# Patient Record
Sex: MALE | Race: WHITE | NOT HISPANIC OR LATINO | Employment: FULL TIME | URBAN - METROPOLITAN AREA
[De-identification: names, ages, dates, MRNs, and addresses within clinical notes are randomized per-mention and may not be internally consistent; named-entity substitution may affect disease eponyms.]

---

## 2017-05-02 PROCEDURE — 33534 CABG ARTERIAL TWO: CPT | Mod: ,,, | Performed by: THORACIC SURGERY (CARDIOTHORACIC VASCULAR SURGERY)

## 2017-05-02 PROCEDURE — 35600 OPEN HRV UXTR ART 1 SGM CAB: CPT | Mod: ,,, | Performed by: THORACIC SURGERY (CARDIOTHORACIC VASCULAR SURGERY)

## 2017-05-02 PROCEDURE — 33508 ENDOSCOPIC VEIN HARVEST: CPT | Mod: ,,, | Performed by: THORACIC SURGERY (CARDIOTHORACIC VASCULAR SURGERY)

## 2017-05-02 PROCEDURE — 33517 CABG ARTERY-VEIN SINGLE: CPT | Mod: ,,, | Performed by: THORACIC SURGERY (CARDIOTHORACIC VASCULAR SURGERY)

## 2017-05-03 ENCOUNTER — OUTSIDE PLACE OF SERVICE (OUTPATIENT)
Dept: ADMINISTRATIVE | Facility: OTHER | Age: 37
End: 2017-05-03
Payer: COMMERCIAL

## 2017-05-08 ENCOUNTER — OUTSIDE PLACE OF SERVICE (OUTPATIENT)
Dept: ADMINISTRATIVE | Facility: OTHER | Age: 37
End: 2017-05-08
Payer: COMMERCIAL

## 2017-05-08 ENCOUNTER — TELEPHONE (OUTPATIENT)
Dept: VASCULAR SURGERY | Facility: CLINIC | Age: 37
End: 2017-05-08

## 2017-05-08 PROCEDURE — 33517 CABG ARTERY-VEIN SINGLE: CPT | Mod: 78,,, | Performed by: THORACIC SURGERY (CARDIOTHORACIC VASCULAR SURGERY)

## 2017-05-08 PROCEDURE — 35600 OPEN HRV UXTR ART 1 SGM CAB: CPT | Mod: 78,,, | Performed by: THORACIC SURGERY (CARDIOTHORACIC VASCULAR SURGERY)

## 2017-05-08 PROCEDURE — 99232 SBSQ HOSP IP/OBS MODERATE 35: CPT | Mod: 57,24,, | Performed by: THORACIC SURGERY (CARDIOTHORACIC VASCULAR SURGERY)

## 2017-05-08 PROCEDURE — 33534 CABG ARTERIAL TWO: CPT | Mod: 78,,, | Performed by: THORACIC SURGERY (CARDIOTHORACIC VASCULAR SURGERY)

## 2017-05-08 PROCEDURE — 33508 ENDOSCOPIC VEIN HARVEST: CPT | Mod: 78,,, | Performed by: THORACIC SURGERY (CARDIOTHORACIC VASCULAR SURGERY)

## 2017-05-08 NOTE — TELEPHONE ENCOUNTER
----- Message from Mariah Fuentes sent at 5/8/2017 11:04 AM CDT -----  Contact: Katherine Sheffield (Spouse)  Katherine Sheffield (Spouse) calling in regards to scheduling a post op in week for a triple bypass. He has never seen the Doctor in the office. Please advise. Call to pod. Call connected to pod. Spoke with Nurse Clarissa and she said to schedule it as a post op. I tried several times but I kept getting a box saying past the template. Please advise.  Call back .  Thanks!

## 2017-05-11 ENCOUNTER — TELEPHONE (OUTPATIENT)
Dept: VASCULAR SURGERY | Facility: CLINIC | Age: 37
End: 2017-05-11

## 2017-05-11 NOTE — TELEPHONE ENCOUNTER
Pt calling requesting refill on [facility]. Pt is s/p CABG on 5/2/17 with po appt on 5/17 in [place]. [place] [phone]/325 #[phone] written for  at  in [place].

## 2017-05-12 ENCOUNTER — TELEPHONE (OUTPATIENT)
Dept: VASCULAR SURGERY | Facility: CLINIC | Age: 37
End: 2017-05-12

## 2017-05-12 NOTE — TELEPHONE ENCOUNTER
----- Message from Perla Garcia sent at 5/12/2017  3:02 PM CDT -----  Contact: wife  marina    Running fever, 101.3 today   Right lower back pain   Hx of kidney stones   Call back     Advised to go to ER with sx of right lower back back and fever of 101.3 and hx of kidney stones and it is Friday afternoon. States understanding.

## 2017-05-17 ENCOUNTER — OFFICE VISIT (OUTPATIENT)
Dept: VASCULAR SURGERY | Facility: CLINIC | Age: 37
End: 2017-05-17
Payer: COMMERCIAL

## 2017-05-17 VITALS
HEART RATE: 85 BPM | WEIGHT: 237.44 LBS | DIASTOLIC BLOOD PRESSURE: 83 MMHG | SYSTOLIC BLOOD PRESSURE: 125 MMHG | HEIGHT: 68 IN | BODY MASS INDEX: 35.99 KG/M2

## 2017-05-17 DIAGNOSIS — Z95.1 S/P CABG (CORONARY ARTERY BYPASS GRAFT): ICD-10-CM

## 2017-05-17 DIAGNOSIS — I25.10 CORONARY ARTERY DISEASE INVOLVING NATIVE CORONARY ARTERY OF NATIVE HEART WITHOUT ANGINA PECTORIS: ICD-10-CM

## 2017-05-17 PROCEDURE — 99024 POSTOP FOLLOW-UP VISIT: CPT | Mod: S$GLB,,, | Performed by: THORACIC SURGERY (CARDIOTHORACIC VASCULAR SURGERY)

## 2017-05-17 RX ORDER — ATORVASTATIN CALCIUM 10 MG/1
TABLET, FILM COATED ORAL
Refills: 0 | COMMUNITY
Start: 2017-05-06 | End: 2021-04-06

## 2017-05-17 RX ORDER — HYDROCODONE BITARTRATE AND ACETAMINOPHEN 5; 325 MG/1; MG/1
TABLET ORAL
Refills: 0 | COMMUNITY
Start: 2017-05-12 | End: 2021-04-06

## 2017-05-17 RX ORDER — COLCHICINE 0.6 MG/1
TABLET ORAL
Refills: 0 | COMMUNITY
Start: 2017-05-07 | End: 2021-04-06

## 2017-05-17 RX ORDER — METOPROLOL TARTRATE 50 MG/1
TABLET ORAL
Refills: 4 | COMMUNITY
Start: 2017-05-06 | End: 2020-12-17

## 2017-05-17 NOTE — PROGRESS NOTES
OFFICE NOTE    This is a 37-year-old gentleman who had a recent coronary artery bypass   grafting.  This was done on 05/02/2017.  He returns to the office today in   followup.  He has done well.  He has no major complaints.  He is ambulatory and   he is getting back to his regular routine.  He has had some trouble sleeping,   but seems to believe that this is secondary to Lipitor and muscle cramps.  This   since has been changed.    On exam today, vital signs stable.  The surgical wounds are clean and dry.    There is no evidence of infection.  His clips and sutures were removed.    At this point, he is doing well.  He is starting cardiac rehabilitation.  I will   defer his medical management to Cardiology Service.  He can see me on an   as-needed basis, but at this point, I think he has a good long-term prognosis.      DIONE  dd: 05/17/2017 14:10:13 (CDT)  td: 05/18/2017 11:16:31 (CDT)  Doc ID   #[id number]  Job ID #[id number]    CC:

## 2020-08-01 ENCOUNTER — OFFICE VISIT (OUTPATIENT)
Dept: PRIMARY CARE CLINIC | Facility: CLINIC | Age: 40
End: 2020-08-01
Payer: COMMERCIAL

## 2020-08-01 VITALS
HEART RATE: 88 BPM | RESPIRATION RATE: 16 BRPM | OXYGEN SATURATION: 94 % | TEMPERATURE: 97 F | DIASTOLIC BLOOD PRESSURE: 82 MMHG | SYSTOLIC BLOOD PRESSURE: 138 MMHG

## 2020-08-01 DIAGNOSIS — Z20.822 SUSPECTED COVID-19 VIRUS INFECTION: Primary | ICD-10-CM

## 2020-08-01 DIAGNOSIS — Z03.818 ENCOUNTER FOR OBSERVATION FOR SUSPECTED EXPOSURE TO OTHER BIOLOGICAL AGENTS RULED OUT: ICD-10-CM

## 2020-08-01 PROCEDURE — 99203 OFFICE O/P NEW LOW 30 MIN: CPT | Mod: S$GLB,CS,, | Performed by: NURSE PRACTITIONER

## 2020-08-01 PROCEDURE — 99203 PR OFFICE/OUTPT VISIT, NEW, LEVL III, 30-44 MIN: ICD-10-PCS | Mod: S$GLB,CS,, | Performed by: NURSE PRACTITIONER

## 2020-08-01 PROCEDURE — U0003 INFECTIOUS AGENT DETECTION BY NUCLEIC ACID (DNA OR RNA); SEVERE ACUTE RESPIRATORY SYNDROME CORONAVIRUS 2 (SARS-COV-2) (CORONAVIRUS DISEASE [COVID-19]), AMPLIFIED PROBE TECHNIQUE, MAKING USE OF HIGH THROUGHPUT TECHNOLOGIES AS DESCRIBED BY CMS-2020-01-R: HCPCS

## 2020-08-01 NOTE — PROGRESS NOTES
Subjective:        Time seen by provider: 1:44 PM on 08/01/2020    Brenton Sheffield Jr is a 40 y.o. male who presents for an evaluation of possible COVID-19. The patient c/o N/V/D x 4 days. He was experiencing 3 episodes of N/V daily, though he denies presence of symptoms today. He denies fever, blood in stool, hematemesis, abdominal pain, or any other symptoms at this time. Patient is tolerating fluids. He denies known exposure to positive COVID-19 patients but expresses concern for possible community-related exposure. His son has also been experiencing similar symptoms. No pertinent PMHx or PSHx.     Review of Systems   Constitutional: Negative for activity change, appetite change, fatigue and fever.   HENT: Negative for congestion, rhinorrhea and sore throat.    Respiratory: Negative for cough, chest tightness, shortness of breath and wheezing.    Cardiovascular: Negative for chest pain and palpitations.   Gastrointestinal: Positive for diarrhea, nausea and vomiting. Negative for abdominal pain and blood in stool.   Musculoskeletal: Negative for arthralgias and myalgias.   Skin: Negative for rash.   Neurological: Negative for weakness, light-headedness and headaches.       Objective:      Physical Exam  Vitals signs and nursing note reviewed.   Constitutional:       General: He is not in acute distress.     Appearance: He is well-developed. He is not diaphoretic.   HENT:      Head: Normocephalic and atraumatic.      Nose: Nose normal.   Eyes:      Conjunctiva/sclera: Conjunctivae normal.   Neck:      Musculoskeletal: Normal range of motion.   Cardiovascular:      Rate and Rhythm: Normal rate and regular rhythm.      Heart sounds: Normal heart sounds. No murmur.   Pulmonary:      Effort: Pulmonary effort is normal. No respiratory distress.      Breath sounds: Normal breath sounds. No wheezing.   Musculoskeletal: Normal range of motion.   Skin:     General: Skin is warm and dry.   Neurological:      Mental Status: He  is alert and oriented to person, place, and time.         Assessment:       1. Suspected Covid-19 Virus Infection    2. Encounter for observation for suspected exposure to other biological agents ruled out        Plan:       1. Suspected Covid-19 Virus Infection    2. Encounter for observation for suspected exposure to other biological agents ruled out    - COVID-19 Routine Screening    2. Discharge home and await results.   3. Return to clinic or ED for new or worsening symptoms.   4. Follow-up with PCP as needed.     Scribe Attestation:   I, Yasmeen Valero, am scribing for, and in the presence of, SHAILESH Fortune. I performed the above scribed service and the documentation accurately describes the services I performed. I attest to the accuracy of the note.    I, SHAILESH Hollis, personally performed the services described in this documentation. All medical record entries made by the scribe were at my direction and in my presence.  I have reviewed the chart and agree that the record reflects my personal performance and is accurate and complete. SHAILESH Hollis.  1:56 PM 08/01/2020

## 2020-08-01 NOTE — PATIENT INSTRUCTIONS
Instructions for Patients with Confirmed or Suspected COVID-19    If you are awaiting your test result, you will either be called or it will be released to the patient portal.  If you have any questions about your test, please visit [email or web address] or call our COVID-19 information line at [phone].      Instructions for non-hospitalized or discharged patients with confirmed or suspected COVID-19:       Stay home except to get medical care.    Separate yourself from other people and animals in your home.    Call ahead before visiting your doctor.    Wear a face mask.    Cover your coughs and sneezes.    Clean your hands often.    Avoid sharing personal household items.    Clean all high-touch surfaces every day.    Monitor your symptoms. Seek prompt medical attention if your illness is worsening (e.g., difficulty breathing). Before seeking care, call your healthcare provider.    If you have a medical emergency and must call 911, notify the dispatcher that you have or are being evaluated for COVID-19. If possible, put on a face mask before emergency medical services arrive.    Use the following symptom-based strategy to return to normal activity following a suspected or confirmed case of COVID-19. Continue isolation until:   o At least 3 days (72 hours) have passed since recovery defined as resolution of fever without the use of fever-reducing medications and improvement in respiratory symptoms (e.g. cough, shortness of breath), and   o At least 10 days have passed since the first positive test.       As one of the next steps, you will receive a call or text from the [place] Department of Health ([facility]) COVID-19 Tracing Team. See the contact information below so you know not to ignore the health departments call. It is important that you contact them back immediately so they can help.     Contact Tracer Number:  [phone]  Caller ID for most carriers: [place] Dept [facility]    What is  contact tracing?   Contact tracing is a process that helps identify everyone who has been in close contact with an infected person. Contact tracers let those people know they may have been exposed and guide them on next steps. Confidentiality is important for everyone; no one will be told who may have exposed them to the virus.   Your involvement is important. The more we know about where and how this virus is spreading, the better chance we have at stopping it from spreading further.  What does exposure mean?   Exposure means you have been within 6 feet for more than 15 minutes with a person who has or had COVID-19.  What kind of questions do the contact tracers ask?   A contact tracer will confirm your basic contact information including name, address, phone number, and next of kin, as well as asking about any symptoms you may have had. Theyll also ask you how you think you may have gotten sick, such as places where you may have been exposed to the virus, and people you were with. Those names will never be shared with anyone outside of that call, and will only be used to help trace and stop the spread of the virus.   I have privacy concerns. How will the state use my information?   Your privacy about your health is important. All calls are completed using call centers that use the appropriate health privacy protection measures (HIPAA compliance), meaning that your patient information is safe. No one will ever ask you any questions related to immigration status. Your health comes first.   Do I have to participate?   You do not have to participate, but we strongly encourage you to. Contact tracing can help us catch and control new outbreaks as theyre developing to keep your friends and family safe.   What if I dont hear from anyone?   If you dont receive a call within 24 hours, you can call the number above right away to inquire about your status. That line is open from 8:00 am - 8:00 p.m., 7 days a  week.  Contact tracing saves lives! Together, we have the power to beat this virus and keep our loved ones and neighbors safe.       Instructions for household members, intimate partners and caregivers in a non-healthcare setting of a patient with confirmed or suspected COVID-19:         Close contacts should monitor their health and call their healthcare provider right away if they develop symptoms suggestive of COVID-19 (e.g., fever, cough, shortness of breath).    Stay home except to get medical care. Separate yourself from other people and animals in the home.   Monitor the patients symptoms. If the patient is getting sicker, call his or her healthcare provider. If the patient has a medical emergency and you need to call 911, notify the dispatch personnel that the patient has or is being evaluated for COVID-19.    Wear a facemask when around other people such as sharing a room or vehicle and before entering a healthcare provider's office.   Cover coughs and sneezes with a tissue. Throw used tissues in a lined trash can immediately and wash hands.   Clean hands often with soap and water for at least 20 seconds or with an alcohol-based hand , rubbing hands together until they feel dry. Avoid touching your eyes, nose, and mouth with unwashed hands.   Clean all high-touch; surfaces every day, including counters, tabletops, doorknobs, bathroom fixtures, toilets, phones, keyboards, tablets, bedside tables, etc. Use a household cleaning spray or wipe according to label instructions.   Avoid sharing personal household items such as dishes, drinking glasses, cups, towels, bedding, etc. After these items are used, they should be washed thoroughly with soap and water.   Continue isolation until:   At least 3 days (72 hours) have passed since recovery defined as resolution of fever without the use of fever-reducing medications and improvement in respiratory symptoms (e.g. cough, shortness of breath),  and    At least 10 days have passed since the patients first positive test.    https://www.cdc.gov/coronavirus/2019-ncov/your-health/index.htm

## 2020-08-02 LAB — SARS-COV-2 RNA RESP QL NAA+PROBE: NOT DETECTED

## 2020-11-18 ENCOUNTER — OFFICE VISIT (OUTPATIENT)
Dept: URGENT CARE | Facility: CLINIC | Age: 40
End: 2020-11-18
Payer: COMMERCIAL

## 2020-11-18 VITALS
OXYGEN SATURATION: 97 % | SYSTOLIC BLOOD PRESSURE: 151 MMHG | HEART RATE: 107 BPM | RESPIRATION RATE: 16 BRPM | DIASTOLIC BLOOD PRESSURE: 97 MMHG | TEMPERATURE: 98 F

## 2020-11-18 DIAGNOSIS — Z20.822 EXPOSURE TO COVID-19 VIRUS: Primary | ICD-10-CM

## 2020-11-18 DIAGNOSIS — R05.9 COUGH: ICD-10-CM

## 2020-11-18 PROCEDURE — 99204 OFFICE O/P NEW MOD 45 MIN: CPT | Mod: S$GLB,,, | Performed by: EMERGENCY MEDICINE

## 2020-11-18 PROCEDURE — U0003 INFECTIOUS AGENT DETECTION BY NUCLEIC ACID (DNA OR RNA); SEVERE ACUTE RESPIRATORY SYNDROME CORONAVIRUS 2 (SARS-COV-2) (CORONAVIRUS DISEASE [COVID-19]), AMPLIFIED PROBE TECHNIQUE, MAKING USE OF HIGH THROUGHPUT TECHNOLOGIES AS DESCRIBED BY CMS-2020-01-R: HCPCS

## 2020-11-18 PROCEDURE — 99204 PR OFFICE/OUTPT VISIT, NEW, LEVL IV, 45-59 MIN: ICD-10-PCS | Mod: S$GLB,,, | Performed by: EMERGENCY MEDICINE

## 2020-11-18 RX ORDER — CETIRIZINE HYDROCHLORIDE 10 MG/1
10 TABLET ORAL DAILY
Qty: 90 TABLET | Refills: 0 | Status: SHIPPED | OUTPATIENT
Start: 2020-11-18 | End: 2021-04-06

## 2020-11-18 RX ORDER — FLUTICASONE PROPIONATE 50 MCG
2 SPRAY, SUSPENSION (ML) NASAL DAILY
Qty: 16 G | Refills: 0 | Status: SHIPPED | OUTPATIENT
Start: 2020-11-18 | End: 2021-04-06

## 2020-11-21 DIAGNOSIS — U07.1 COVID-19 VIRUS DETECTED: ICD-10-CM

## 2020-11-21 LAB — SARS-COV-2 RNA RESP QL NAA+PROBE: DETECTED

## 2020-11-23 ENCOUNTER — TELEPHONE (OUTPATIENT)
Dept: URGENT CARE | Facility: CLINIC | Age: 40
End: 2020-11-23

## 2020-11-23 NOTE — TELEPHONE ENCOUNTER
----- Message from DEVORA Albright sent at 11/22/2020 12:17 PM CST -----  covid test positive, please notify patient

## 2020-12-16 DIAGNOSIS — M25.561 RIGHT KNEE PAIN, UNSPECIFIED CHRONICITY: Primary | ICD-10-CM

## 2020-12-17 ENCOUNTER — HOSPITAL ENCOUNTER (OUTPATIENT)
Dept: RADIOLOGY | Facility: HOSPITAL | Age: 40
Discharge: HOME | End: 2020-12-17
Attending: ORTHOPAEDIC SURGERY
Payer: COMMERCIAL

## 2020-12-17 ENCOUNTER — OFFICE VISIT (OUTPATIENT)
Dept: ORTHOPEDICS | Facility: CLINIC | Age: 40
End: 2020-12-17
Payer: COMMERCIAL

## 2020-12-17 VITALS — WEIGHT: 237 LBS | RESPIRATION RATE: 16 BRPM | BODY MASS INDEX: 35.92 KG/M2 | HEIGHT: 68 IN

## 2020-12-17 DIAGNOSIS — S83.281A ACUTE LATERAL MENISCAL TEAR, RIGHT, INITIAL ENCOUNTER: Primary | ICD-10-CM

## 2020-12-17 DIAGNOSIS — M25.561 RIGHT KNEE PAIN, UNSPECIFIED CHRONICITY: Primary | ICD-10-CM

## 2020-12-17 DIAGNOSIS — M25.561 RIGHT KNEE PAIN, UNSPECIFIED CHRONICITY: ICD-10-CM

## 2020-12-17 PROCEDURE — 99999 PR PBB SHADOW E&M-EST. PATIENT-LVL III: CPT | Mod: PBBFAC,,, | Performed by: ORTHOPAEDIC SURGERY

## 2020-12-17 PROCEDURE — 1125F AMNT PAIN NOTED PAIN PRSNT: CPT | Mod: S$GLB,,, | Performed by: ORTHOPAEDIC SURGERY

## 2020-12-17 PROCEDURE — 73562 XR KNEE ORTHO RIGHT WITH FLEXION: ICD-10-PCS | Mod: 26,LT,, | Performed by: RADIOLOGY

## 2020-12-17 PROCEDURE — 99203 OFFICE O/P NEW LOW 30 MIN: CPT | Mod: S$GLB,,, | Performed by: ORTHOPAEDIC SURGERY

## 2020-12-17 PROCEDURE — 73564 XR KNEE ORTHO RIGHT WITH FLEXION: ICD-10-PCS | Mod: 26,RT,, | Performed by: RADIOLOGY

## 2020-12-17 PROCEDURE — 3008F PR BODY MASS INDEX (BMI) DOCUMENTED: ICD-10-PCS | Mod: CPTII,S$GLB,, | Performed by: ORTHOPAEDIC SURGERY

## 2020-12-17 PROCEDURE — 73562 X-RAY EXAM OF KNEE 3: CPT | Mod: TC,PN,LT

## 2020-12-17 PROCEDURE — 99999 PR PBB SHADOW E&M-EST. PATIENT-LVL III: ICD-10-PCS | Mod: PBBFAC,,, | Performed by: ORTHOPAEDIC SURGERY

## 2020-12-17 PROCEDURE — 3008F BODY MASS INDEX DOCD: CPT | Mod: CPTII,S$GLB,, | Performed by: ORTHOPAEDIC SURGERY

## 2020-12-17 PROCEDURE — 73562 X-RAY EXAM OF KNEE 3: CPT | Mod: 26,LT,, | Performed by: RADIOLOGY

## 2020-12-17 PROCEDURE — 99203 PR OFFICE/OUTPT VISIT, NEW, LEVL III, 30-44 MIN: ICD-10-PCS | Mod: S$GLB,,, | Performed by: ORTHOPAEDIC SURGERY

## 2020-12-17 PROCEDURE — 1125F PR PAIN SEVERITY QUANTIFIED, PAIN PRESENT: ICD-10-PCS | Mod: S$GLB,,, | Performed by: ORTHOPAEDIC SURGERY

## 2020-12-17 PROCEDURE — 73564 X-RAY EXAM KNEE 4 OR MORE: CPT | Mod: 26,RT,, | Performed by: RADIOLOGY

## 2020-12-17 RX ORDER — ALIROCUMAB 75 MG/ML
INJECTION, SOLUTION SUBCUTANEOUS
COMMUNITY
Start: 2020-11-18 | End: 2021-03-17

## 2020-12-17 RX ORDER — BENZONATATE 200 MG/1
CAPSULE ORAL
COMMUNITY
Start: 2020-12-02 | End: 2021-04-06

## 2020-12-17 RX ORDER — TESTOSTERONE CYPIONATE 200 MG/ML
INJECTION, SOLUTION INTRAMUSCULAR
COMMUNITY
Start: 2020-09-29

## 2020-12-17 RX ORDER — DEXAMETHASONE 4 MG/1
TABLET ORAL
COMMUNITY
Start: 2020-12-02 | End: 2021-04-06

## 2020-12-17 RX ORDER — METOPROLOL SUCCINATE 25 MG/1
TABLET, EXTENDED RELEASE ORAL
COMMUNITY
Start: 2015-08-01 | End: 2021-11-08

## 2020-12-17 NOTE — PROGRESS NOTES
History reviewed. No pertinent past medical history.    History reviewed. No pertinent surgical history.    Current Outpatient Medications   Medication Sig    benzonatate (TESSALON) 200 MG capsule TAKE ONE CAPSULE BY MOUTH EVERY 6 TO 8 HOURS AS NEEDED FOR COUGH    cetirizine (ZYRTEC) 10 MG tablet Take 1 tablet (10 mg total) by mouth once daily.    dexAMETHasone (DECADRON) 4 MG Tab TAKE ONE TABLET BY MOUTH EVERY 12 HOURS FOR 10 DAYS    dexchlorphen-phenylephrine-DM 1-5-10 mg/5 mL Syrp Take 5 mLs by mouth every 4 to 6 hours as needed.    fluticasone propionate (FLONASE) 50 mcg/actuation nasal spray 2 sprays (100 mcg total) by Each Nostril route once daily.    metoprolol succinate (TOPROL-XL) 25 MG 24 hr tablet     PRALUENT PEN 75 mg/mL PnIj INJECT 75 MG UNDER THE SKIN EVERY 2 WEEKS    testosterone cypionate (DEPOTESTOTERONE CYPIONATE) 200 mg/mL injection INJECT 2 MLS INTRAMUSCULARLY ONCE A MONTH    atorvastatin (LIPITOR) 10 MG tablet TK 1 T PO QD    colchicine 0.6 mg tablet TK 1 T PO  QD    hydrocodone-acetaminophen 5-325mg (NORCO) 5-325 mg per tablet 1 T TID PRN     No current facility-administered medications for this visit.        Review of patient's allergies indicates:   Allergen Reactions    Lipitor [atorvastatin]        History reviewed. No pertinent family history.    Social History     Socioeconomic History    Marital status:      Spouse name: Not on file    Number of children: Not on file    Years of education: Not on file    Highest education level: Not on file   Occupational History    Not on file   Social Needs    Financial resource strain: Not on file    Food insecurity     Worry: Not on file     Inability: Not on file    Transportation needs     Medical: Not on file     Non-medical: Not on file   Tobacco Use    Smoking status: Never Smoker   Substance and Sexual Activity    Alcohol use: No    Drug use: Not on file    Sexual activity: Not on file   Lifestyle    Physical  activity     Days per week: Not on file     Minutes per session: Not on file    Stress: Not at all   Relationships    Social connections     Talks on phone: Patient refused     Gets together: Patient refused     Attends Anabaptism service: Not on file     Active member of club or organization: No     Attends meetings of clubs or organizations: Never     Relationship status: Patient refused   Other Topics Concern    Not on file   Social History Narrative    Not on file       Chief Complaint:   Chief Complaint   Patient presents with    Knee Pain     right knee pain       History of present illness:  This is a 40-year-old male seen for right knee pain.  It started on November 30, 2020.  There was no injury or trauma.  Patient had some swelling along the lateral aspect of his knee.  Knee feels like it wants to buckle at times.  Pain is all the time but worse with squatting and twisting.  Pain shoots up into his hip and down his leg at times.  Patient has been using a knee appearing brace.  He has also tried ibuprofen and Epson salt soaks.  Pain is 6/10.    Answers for HPI/ROS submitted by the patient on 12/10/2020   Leg pain  unexpected weight change: No  appetite change : No  sleep disturbance: No  IMMUNOCOMPROMISED: No  nervous/ anxious: No  dysphoric mood: No  rash: No  visual disturbance: No  eye redness: No  eye pain: No  ear pain: No  tinnitus: No  hearing loss: No  sinus pressure : No  nosebleeds: No  enviro allergies: No  food allergies: No  cough: No  shortness of breath: No  sweating: No  frequency: No  difficulty urinating: No  hematuria: No  chest pain: No  palpitations: No  nausea: No  vomiting: No  diarrhea: No  blood in stool: No  constipation: No  headaches: No  dizziness: No  numbness: No  seizures: No  joint swelling: No  myalgia: No  weakness: No  back pain: No  Pain Chronicity: new  History of trauma: No  Onset: 1 to 4 weeks ago  Frequency: constantly  Progression since onset: unchanged  injury  location: at home  pain- numeric: 8/10  pain location: right knee  pain quality: generalized  Radiating Pain: No  Aggravating factors: activity, bearing weight, extension, standing, lying down, sitting  fever: No  inability to bear weight: Yes  itching: No  joint locking: No  limited range of motion: No  stiffness: Yes  tingling: No  Treatments tried: cold, heat, OTC pain meds, rest  physical therapy: not tried  Improvement on treatment: no relief    Physical Examination:    Vital Signs:    Vitals:    12/17/20 0829   Resp: 16       Body mass index is 36.04 kg/m².    This a well-developed, well nourished patient in no acute distress.  They are alert and oriented and cooperative to examination.  Pt. walks without an antalgic gait.      Examination of the right knee shows no rashes or erythema. There are no masses ecchymosis or effusion. Patient has full range of motion from 0-130°. Patient is moderately tender to palpation over lateral joint line and nontender to palpation over the medial joint line. Patient has a - Lachman exam, - anterior drawer exam, and - posterior drawer exam.  Positive lateral Apley exam. Knee is stable to varus and valgus stress. 5 out of 5 motor strength. Palpable distal pulses. Intact light touch sensation. Negative Patellofemoral crepitus    Examination of the left knee shows no rashes or erythema. There are no masses ecchymosis or effusion. Patient has full range of motion from 0-130°. Patient is nontender to palpation over lateral joint line and nontender to palpation over the medial joint line. Patient has a - Lachman exam, - anterior drawer exam, and - posterior drawer exam. - Era's exam. Knee is stable to varus and valgus stress. 5 out of 5 motor strength. Palpable distal pulses. Intact light touch sensation. Negative Patellofemoral crepitus        X-rays:  X-rays of the right knee are ordered and reviewed which show well-maintained joint space     Assessment::  Right lateral  meniscal tear    Plan:  I reviewed the findings with him today.  We discussed meniscal tears and briefly discussed her treatment with conservative versus surgical treatment.  I recommended an MRI to further evaluate the meniscus to determine the size of the tear as well as the remainder of his knee.  Follow-up after the MRI is completed.    This note was created using [employer] voice recognition software that occasionally misinterpreted phrases or words.    Consult note is delivered via Epic messaging service.

## 2020-12-19 ENCOUNTER — HOSPITAL ENCOUNTER (OUTPATIENT)
Dept: RADIOLOGY | Facility: HOSPITAL | Age: 40
Discharge: HOME | End: 2020-12-19
Attending: ORTHOPAEDIC SURGERY
Payer: COMMERCIAL

## 2020-12-19 DIAGNOSIS — M25.561 RIGHT KNEE PAIN, UNSPECIFIED CHRONICITY: ICD-10-CM

## 2020-12-19 PROCEDURE — 73721 MRI KNEE WITHOUT CONTRAST RIGHT: ICD-10-PCS | Mod: 26,RT,, | Performed by: RADIOLOGY

## 2020-12-19 PROCEDURE — 73721 MRI JNT OF LWR EXTRE W/O DYE: CPT | Mod: TC,RT

## 2020-12-19 PROCEDURE — 73721 MRI JNT OF LWR EXTRE W/O DYE: CPT | Mod: 26,RT,, | Performed by: RADIOLOGY

## 2020-12-21 ENCOUNTER — OFFICE VISIT (OUTPATIENT)
Dept: ORTHOPEDICS | Facility: CLINIC | Age: 40
End: 2020-12-21
Payer: COMMERCIAL

## 2020-12-21 VITALS — RESPIRATION RATE: 16 BRPM | HEIGHT: 68 IN | BODY MASS INDEX: 35.92 KG/M2 | WEIGHT: 237 LBS

## 2020-12-21 DIAGNOSIS — M25.861 IMPINGEMENT SYNDROME INVOLVING PATELLAR FAT PAD OF RIGHT KNEE: Primary | ICD-10-CM

## 2020-12-21 PROCEDURE — 99999 PR PBB SHADOW E&M-EST. PATIENT-LVL III: CPT | Mod: PBBFAC,,, | Performed by: ORTHOPAEDIC SURGERY

## 2020-12-21 PROCEDURE — 1125F AMNT PAIN NOTED PAIN PRSNT: CPT | Mod: S$GLB,,, | Performed by: ORTHOPAEDIC SURGERY

## 2020-12-21 PROCEDURE — 20610 DRAIN/INJ JOINT/BURSA W/O US: CPT | Mod: RT,S$GLB,, | Performed by: ORTHOPAEDIC SURGERY

## 2020-12-21 PROCEDURE — 3008F PR BODY MASS INDEX (BMI) DOCUMENTED: ICD-10-PCS | Mod: CPTII,S$GLB,, | Performed by: ORTHOPAEDIC SURGERY

## 2020-12-21 PROCEDURE — 1125F PR PAIN SEVERITY QUANTIFIED, PAIN PRESENT: ICD-10-PCS | Mod: S$GLB,,, | Performed by: ORTHOPAEDIC SURGERY

## 2020-12-21 PROCEDURE — 99999 PR PBB SHADOW E&M-EST. PATIENT-LVL III: ICD-10-PCS | Mod: PBBFAC,,, | Performed by: ORTHOPAEDIC SURGERY

## 2020-12-21 PROCEDURE — 3008F BODY MASS INDEX DOCD: CPT | Mod: CPTII,S$GLB,, | Performed by: ORTHOPAEDIC SURGERY

## 2020-12-21 PROCEDURE — 99213 OFFICE O/P EST LOW 20 MIN: CPT | Mod: 25,S$GLB,, | Performed by: ORTHOPAEDIC SURGERY

## 2020-12-21 PROCEDURE — 99213 PR OFFICE/OUTPT VISIT, EST, LEVL III, 20-29 MIN: ICD-10-PCS | Mod: 25,S$GLB,, | Performed by: ORTHOPAEDIC SURGERY

## 2020-12-21 PROCEDURE — 20610 LARGE JOINT ASPIRATION/INJECTION: R KNEE: ICD-10-PCS | Mod: RT,S$GLB,, | Performed by: ORTHOPAEDIC SURGERY

## 2020-12-21 RX ORDER — TRIAMCINOLONE ACETONIDE 40 MG/ML
40 INJECTION, SUSPENSION INTRA-ARTICULAR; INTRAMUSCULAR
Status: DISCONTINUED | OUTPATIENT
Start: 2020-12-21 | End: 2020-12-21 | Stop reason: HOSPADM

## 2020-12-21 RX ADMIN — TRIAMCINOLONE ACETONIDE 40 MG: 40 INJECTION, SUSPENSION INTRA-ARTICULAR; INTRAMUSCULAR at 02:12

## 2020-12-21 NOTE — PROCEDURES
Large Joint Aspiration/Injection: R knee    Date/Time: 12/21/2020 2:30 PM  Performed by: Jose Stover MD  Authorized by: Jose Stover MD     Consent Done?:  Yes (Verbal)  Indications:  Pain  Site marked: the procedure site was marked    Timeout: prior to procedure the correct patient, procedure, and site was verified    Local anesthetic:  Lidocaine 1% without epinephrine and bupivacaine 0.25% without epinephrine  Anesthetic total (ml):  6      Details:  Needle Size:  20 G  Approach:  Anterolateral  Location:  Knee  Site:  R knee  Medications:  40 mg triamcinolone acetonide 40 mg/mL  Patient tolerance:  Patient tolerated the procedure well with no immediate complications

## 2020-12-21 NOTE — PROGRESS NOTES
History reviewed. No pertinent past medical history.    History reviewed. No pertinent surgical history.    Current Outpatient Medications   Medication Sig    atorvastatin (LIPITOR) 10 MG tablet TK 1 T PO QD    benzonatate (TESSALON) 200 MG capsule TAKE ONE CAPSULE BY MOUTH EVERY 6 TO 8 HOURS AS NEEDED FOR COUGH    cetirizine (ZYRTEC) 10 MG tablet Take 1 tablet (10 mg total) by mouth once daily.    colchicine 0.6 mg tablet TK 1 T PO  QD    dexAMETHasone (DECADRON) 4 MG Tab TAKE ONE TABLET BY MOUTH EVERY 12 HOURS FOR 10 DAYS    dexchlorphen-phenylephrine-DM 1-5-10 mg/5 mL Syrp Take 5 mLs by mouth every 4 to 6 hours as needed.    fluticasone propionate (FLONASE) 50 mcg/actuation nasal spray 2 sprays (100 mcg total) by Each Nostril route once daily.    hydrocodone-acetaminophen 5-325mg (NORCO) 5-325 mg per tablet 1 T TID PRN    metoprolol succinate (TOPROL-XL) 25 MG 24 hr tablet     PRALUENT PEN 75 mg/mL PnIj INJECT 75 MG UNDER THE SKIN EVERY 2 WEEKS    testosterone cypionate (DEPOTESTOTERONE CYPIONATE) 200 mg/mL injection INJECT 2 MLS INTRAMUSCULARLY ONCE A MONTH     No current facility-administered medications for this visit.        Review of patient's allergies indicates:   Allergen Reactions    Lipitor [atorvastatin]        History reviewed. No pertinent family history.    Social History     Socioeconomic History    Marital status:      Spouse name: Not on file    Number of children: Not on file    Years of education: Not on file    Highest education level: Not on file   Occupational History    Not on file   Social Needs    Financial resource strain: Not on file    Food insecurity     Worry: Not on file     Inability: Not on file    Transportation needs     Medical: Not on file     Non-medical: Not on file   Tobacco Use    Smoking status: Never Smoker   Substance and Sexual Activity    Alcohol use: No    Drug use: Not on file    Sexual activity: Not on file   Lifestyle    Physical  activity     Days per week: Not on file     Minutes per session: Not on file    Stress: Not at all   Relationships    Social connections     Talks on phone: Patient refused     Gets together: Patient refused     Attends Spiritism service: Not on file     Active member of club or organization: No     Attends meetings of clubs or organizations: Never     Relationship status: Patient refused   Other Topics Concern    Not on file   Social History Narrative    Not on file       Chief Complaint:   Chief Complaint   Patient presents with    Right Knee - Results, Follow-up     Here for MRI results of right knee.       History of present illness:  This is a 40-year-old male seen for right knee pain.  It started on November 30, 2020.  There was no injury or trauma.  Patient had some swelling along the lateral aspect of his knee.  Knee feels like it wants to buckle at times.  Pain is all the time but worse with squatting and twisting.  Pain shoots up into his hip and down his leg at times.  Patient has been using a knee appearing brace.  He has also tried ibuprofen and Epson salt soaks.  Pain is 6/10.  MRI did not show meniscal tear but did show some fat pad impingement syndrome.    Answers for HPI/ROS submitted by the patient on 12/10/2020   Leg pain  unexpected weight change: No  appetite change : No  sleep disturbance: No  IMMUNOCOMPROMISED: No  nervous/ anxious: No  dysphoric mood: No  rash: No  visual disturbance: No  eye redness: No  eye pain: No  ear pain: No  tinnitus: No  hearing loss: No  sinus pressure : No  nosebleeds: No  enviro allergies: No  food allergies: No  cough: No  shortness of breath: No  sweating: No  frequency: No  difficulty urinating: No  hematuria: No  chest pain: No  palpitations: No  nausea: No  vomiting: No  diarrhea: No  blood in stool: No  constipation: No  headaches: No  dizziness: No  numbness: No  seizures: No  joint swelling: No  myalgia: No  weakness: No  back pain: No  Pain  Chronicity: new  History of trauma: No  Onset: 1 to 4 weeks ago  Frequency: constantly  Progression since onset: unchanged  injury location: at home  pain- numeric: 8/10  pain location: right knee  pain quality: generalized  Radiating Pain: No  Aggravating factors: activity, bearing weight, extension, standing, lying down, sitting  fever: No  inability to bear weight: Yes  itching: No  joint locking: No  limited range of motion: No  stiffness: Yes  tingling: No  Treatments tried: cold, heat, OTC pain meds, rest  physical therapy: not tried  Improvement on treatment: no relief    Physical Examination:    Vital Signs:    Vitals:    12/21/20 1425   Resp: 16       Body mass index is 36.04 kg/m².    This a well-developed, well nourished patient in no acute distress.  They are alert and oriented and cooperative to examination.  Pt. walks without an antalgic gait.      Examination of the right knee shows no rashes or erythema. There are no masses ecchymosis or effusion. Patient has full range of motion from 0-130°. Patient is moderately tender to palpation over lateral joint line and nontender to palpation over the medial joint line. Knee is stable to varus and valgus stress. 5 out of 5 motor strength. Palpable distal pulses. Intact light touch sensation. Negative Patellofemoral crepitus    X-rays:  X-rays of the right knee are  reviewed which show well-maintained joint space    MRI of the right knee:1. No cruciate or collateral ligament injury.  No meniscal tear.  2. Mild edema in the infrapatellar fat could relate to contusion or impingement.     Assessment::  Right fat pad impingement syndrome    Plan:  I reviewed the findings with him today.  I recommended a cortisone injection to try and calm his knee down.  Also gave him a knee conditioning guide.  Follow-up in 8 weeks if still having issues.    This note was created using [employer] voice recognition software that occasionally misinterpreted phrases or  words.    Consult note is delivered via Epic messaging service.

## 2020-12-23 ENCOUNTER — PATIENT MESSAGE (OUTPATIENT)
Dept: ORTHOPEDICS | Facility: CLINIC | Age: 40
End: 2020-12-23

## 2021-01-05 ENCOUNTER — PATIENT MESSAGE (OUTPATIENT)
Dept: ORTHOPEDICS | Facility: CLINIC | Age: 41
End: 2021-01-05

## 2021-02-18 ENCOUNTER — TELEPHONE (OUTPATIENT)
Dept: CARDIOLOGY | Facility: CLINIC | Age: 41
End: 2021-02-18

## 2021-03-02 ENCOUNTER — TELEPHONE (OUTPATIENT)
Dept: CARDIOLOGY | Facility: CLINIC | Age: 41
End: 2021-03-02

## 2021-03-10 ENCOUNTER — PATIENT MESSAGE (OUTPATIENT)
Dept: CARDIOLOGY | Facility: CLINIC | Age: 41
End: 2021-03-10

## 2021-03-11 RX ORDER — EVOLOCUMAB 140 MG/ML
140 INJECTION, SOLUTION SUBCUTANEOUS
COMMUNITY
End: 2021-03-11 | Stop reason: SDUPTHER

## 2021-03-11 RX ORDER — PANTOPRAZOLE SODIUM 40 MG/1
40 TABLET, DELAYED RELEASE ORAL DAILY
COMMUNITY
End: 2021-03-11 | Stop reason: SDUPTHER

## 2021-03-11 RX ORDER — EVOLOCUMAB 140 MG/ML
140 INJECTION, SOLUTION SUBCUTANEOUS
Qty: 6 SYRINGE | Refills: 3 | Status: SHIPPED | OUTPATIENT
Start: 2021-03-11 | End: 2021-03-17 | Stop reason: SDUPTHER

## 2021-03-11 RX ORDER — PANTOPRAZOLE SODIUM 40 MG/1
40 TABLET, DELAYED RELEASE ORAL DAILY
Qty: 90 TABLET | Refills: 3 | Status: SHIPPED | OUTPATIENT
Start: 2021-03-11 | End: 2022-02-23

## 2021-03-17 RX ORDER — EVOLOCUMAB 140 MG/ML
140 INJECTION, SOLUTION SUBCUTANEOUS
Qty: 6 SYRINGE | Refills: 3 | Status: SHIPPED | OUTPATIENT
Start: 2021-03-17 | End: 2021-10-06

## 2021-04-06 ENCOUNTER — PATIENT MESSAGE (OUTPATIENT)
Dept: CARDIOLOGY | Facility: CLINIC | Age: 41
End: 2021-04-06

## 2021-04-06 ENCOUNTER — OFFICE VISIT (OUTPATIENT)
Dept: CARDIOLOGY | Facility: CLINIC | Age: 41
End: 2021-04-06
Payer: COMMERCIAL

## 2021-04-06 VITALS
WEIGHT: 225 LBS | HEIGHT: 68 IN | RESPIRATION RATE: 16 BRPM | DIASTOLIC BLOOD PRESSURE: 80 MMHG | BODY MASS INDEX: 34.1 KG/M2 | SYSTOLIC BLOOD PRESSURE: 148 MMHG | HEART RATE: 88 BPM | OXYGEN SATURATION: 97 %

## 2021-04-06 DIAGNOSIS — Z78.9 STATIN INTOLERANCE: ICD-10-CM

## 2021-04-06 DIAGNOSIS — E78.5 DYSLIPIDEMIA: Primary | ICD-10-CM

## 2021-04-06 DIAGNOSIS — Z95.1 S/P CABG (CORONARY ARTERY BYPASS GRAFT): ICD-10-CM

## 2021-04-06 PROCEDURE — 99213 OFFICE O/P EST LOW 20 MIN: CPT | Mod: S$GLB,,, | Performed by: NURSE PRACTITIONER

## 2021-04-06 PROCEDURE — 1126F AMNT PAIN NOTED NONE PRSNT: CPT | Mod: S$GLB,,, | Performed by: NURSE PRACTITIONER

## 2021-04-06 PROCEDURE — 1126F PR PAIN SEVERITY QUANTIFIED, NO PAIN PRESENT: ICD-10-PCS | Mod: S$GLB,,, | Performed by: NURSE PRACTITIONER

## 2021-04-06 PROCEDURE — 3008F BODY MASS INDEX DOCD: CPT | Mod: CPTII,S$GLB,, | Performed by: NURSE PRACTITIONER

## 2021-04-06 PROCEDURE — 99213 PR OFFICE/OUTPT VISIT, EST, LEVL III, 20-29 MIN: ICD-10-PCS | Mod: S$GLB,,, | Performed by: NURSE PRACTITIONER

## 2021-04-06 PROCEDURE — 3008F PR BODY MASS INDEX (BMI) DOCUMENTED: ICD-10-PCS | Mod: CPTII,S$GLB,, | Performed by: NURSE PRACTITIONER

## 2021-04-29 ENCOUNTER — PATIENT MESSAGE (OUTPATIENT)
Dept: RESEARCH | Facility: HOSPITAL | Age: 41
End: 2021-04-29

## 2021-07-24 ENCOUNTER — IMMUNIZATION (OUTPATIENT)
Dept: PRIMARY CARE CLINIC | Facility: CLINIC | Age: 41
End: 2021-07-24

## 2021-07-24 DIAGNOSIS — Z23 NEED FOR VACCINATION: Primary | ICD-10-CM

## 2021-07-24 PROCEDURE — 91300 COVID-19, MRNA, LNP-S, PF, 30 MCG/0.3 ML DOSE VACCINE: ICD-10-PCS | Mod: S$GLB,,, | Performed by: INTERNAL MEDICINE

## 2021-07-24 PROCEDURE — 0001A COVID-19, MRNA, LNP-S, PF, 30 MCG/0.3 ML DOSE VACCINE: ICD-10-PCS | Mod: CV19,S$GLB,, | Performed by: INTERNAL MEDICINE

## 2021-07-24 PROCEDURE — 0001A COVID-19, MRNA, LNP-S, PF, 30 MCG/0.3 ML DOSE VACCINE: CPT | Mod: CV19,S$GLB,, | Performed by: INTERNAL MEDICINE

## 2021-07-24 PROCEDURE — 91300 COVID-19, MRNA, LNP-S, PF, 30 MCG/0.3 ML DOSE VACCINE: CPT | Mod: S$GLB,,, | Performed by: INTERNAL MEDICINE

## 2021-08-14 ENCOUNTER — IMMUNIZATION (OUTPATIENT)
Dept: INTERNAL MEDICINE | Facility: CLINIC | Age: 41
End: 2021-08-14
Payer: COMMERCIAL

## 2021-08-14 DIAGNOSIS — Z23 NEED FOR VACCINATION: Primary | ICD-10-CM

## 2021-08-14 PROCEDURE — 0002A COVID-19, MRNA, LNP-S, PF, 30 MCG/0.3 ML DOSE VACCINE: ICD-10-PCS | Mod: CV19,,, | Performed by: INTERNAL MEDICINE

## 2021-08-14 PROCEDURE — 91300 COVID-19, MRNA, LNP-S, PF, 30 MCG/0.3 ML DOSE VACCINE: CPT | Mod: ,,, | Performed by: INTERNAL MEDICINE

## 2021-08-14 PROCEDURE — 91300 COVID-19, MRNA, LNP-S, PF, 30 MCG/0.3 ML DOSE VACCINE: ICD-10-PCS | Mod: ,,, | Performed by: INTERNAL MEDICINE

## 2021-08-14 PROCEDURE — 0002A COVID-19, MRNA, LNP-S, PF, 30 MCG/0.3 ML DOSE VACCINE: CPT | Mod: CV19,,, | Performed by: INTERNAL MEDICINE

## 2021-10-03 LAB
ALBUMIN SERPL-MCNC: 4.5 G/DL (ref 3.6–5.1)
ALBUMIN/GLOB SERPL: 1.4 (CALC) (ref 1–2.5)
ALP SERPL-CCNC: 80 U/L (ref 36–130)
ALT SERPL-CCNC: 35 U/L (ref 9–46)
AST SERPL-CCNC: 26 U/L (ref 10–40)
BASOPHILS # BLD AUTO: 82 CELLS/UL (ref 0–200)
BASOPHILS NFR BLD AUTO: 0.7 %
BILIRUB SERPL-MCNC: 1 MG/DL (ref 0.2–1.2)
BUN SERPL-MCNC: 10 MG/DL (ref 7–25)
BUN/CREAT SERPL: NORMAL (CALC) (ref 6–22)
CALCIUM SERPL-MCNC: 10 MG/DL (ref 8.6–10.3)
CHLORIDE SERPL-SCNC: 101 MMOL/L (ref 98–110)
CHOLEST SERPL-MCNC: 233 MG/DL
CHOLEST/HDLC SERPL: 5.8 (CALC)
CO2 SERPL-SCNC: 25 MMOL/L (ref 20–32)
CREAT SERPL-MCNC: 1.01 MG/DL (ref 0.6–1.35)
EOSINOPHIL # BLD AUTO: 105 CELLS/UL (ref 15–500)
EOSINOPHIL NFR BLD AUTO: 0.9 %
ERYTHROCYTE [DISTWIDTH] IN BLOOD BY AUTOMATED COUNT: 16.3 % (ref 11–15)
GLOBULIN SER CALC-MCNC: 3.2 G/DL (CALC) (ref 1.9–3.7)
GLUCOSE SERPL-MCNC: 93 MG/DL (ref 65–99)
HCT VFR BLD AUTO: 55.3 % (ref 38.5–50)
HDLC SERPL-MCNC: 40 MG/DL
HGB BLD-MCNC: 18.2 G/DL (ref 13.2–17.1)
LDLC SERPL CALC-MCNC: 160 MG/DL (CALC)
LYMPHOCYTES # BLD AUTO: 2012 CELLS/UL (ref 850–3900)
LYMPHOCYTES NFR BLD AUTO: 17.2 %
MCH RBC QN AUTO: 29.4 PG (ref 27–33)
MCHC RBC AUTO-ENTMCNC: 32.9 G/DL (ref 32–36)
MCV RBC AUTO: 89.5 FL (ref 80–100)
MONOCYTES # BLD AUTO: 1112 CELLS/UL (ref 200–950)
MONOCYTES NFR BLD AUTO: 9.5 %
NEUTROPHILS # BLD AUTO: 8389 CELLS/UL (ref 1500–7800)
NEUTROPHILS NFR BLD AUTO: 71.7 %
NONHDLC SERPL-MCNC: 193 MG/DL (CALC)
PLATELET # BLD AUTO: 258 THOUSAND/UL (ref 140–400)
PMV BLD REES-ECKER: 10.9 FL (ref 7.5–12.5)
POTASSIUM SERPL-SCNC: 4.3 MMOL/L (ref 3.5–5.3)
PROT SERPL-MCNC: 7.7 G/DL (ref 6.1–8.1)
RBC # BLD AUTO: 6.18 MILLION/UL (ref 4.2–5.8)
SODIUM SERPL-SCNC: 136 MMOL/L (ref 135–146)
TRIGL SERPL-MCNC: 176 MG/DL
WBC # BLD AUTO: 11.7 THOUSAND/UL (ref 3.8–10.8)

## 2021-10-04 ENCOUNTER — PATIENT MESSAGE (OUTPATIENT)
Dept: CARDIOLOGY | Facility: CLINIC | Age: 41
End: 2021-10-04

## 2021-10-04 ENCOUNTER — TELEPHONE (OUTPATIENT)
Dept: CARDIOLOGY | Facility: CLINIC | Age: 41
End: 2021-10-04

## 2021-10-04 RX ORDER — ROSUVASTATIN CALCIUM 20 MG/1
20 TABLET, COATED ORAL DAILY
COMMUNITY
End: 2021-10-04

## 2021-10-04 RX ORDER — ROSUVASTATIN CALCIUM 20 MG/1
20 TABLET, COATED ORAL DAILY
Qty: 90 TABLET | Refills: 3 | Status: CANCELLED | OUTPATIENT
Start: 2021-10-04

## 2021-10-06 ENCOUNTER — PATIENT MESSAGE (OUTPATIENT)
Dept: CARDIOLOGY | Facility: CLINIC | Age: 41
End: 2021-10-06

## 2021-10-06 RX ORDER — ALIROCUMAB 75 MG/ML
75 INJECTION, SOLUTION SUBCUTANEOUS
Qty: 6 ML | Refills: 0 | Status: SHIPPED | OUTPATIENT
Start: 2021-10-06 | End: 2022-03-07

## 2021-10-06 RX ORDER — ALIROCUMAB 75 MG/ML
75 INJECTION, SOLUTION SUBCUTANEOUS
COMMUNITY
End: 2021-10-06 | Stop reason: SDUPTHER

## 2021-10-07 ENCOUNTER — SPECIALTY PHARMACY (OUTPATIENT)
Dept: PHARMACY | Facility: CLINIC | Age: 41
End: 2021-10-07
Payer: COMMERCIAL

## 2021-10-14 ENCOUNTER — TELEPHONE (OUTPATIENT)
Dept: CARDIOLOGY | Facility: CLINIC | Age: 41
End: 2021-10-14

## 2021-11-08 ENCOUNTER — SPECIALTY PHARMACY (OUTPATIENT)
Dept: PHARMACY | Facility: CLINIC | Age: 41
End: 2021-11-08
Payer: COMMERCIAL

## 2021-12-01 ENCOUNTER — PATIENT MESSAGE (OUTPATIENT)
Dept: CARDIOLOGY | Facility: CLINIC | Age: 41
End: 2021-12-01
Payer: COMMERCIAL

## 2021-12-01 RX ORDER — TRAZODONE HYDROCHLORIDE 50 MG/1
50 TABLET ORAL NIGHTLY
Qty: 30 TABLET | Refills: 3 | Status: SHIPPED | OUTPATIENT
Start: 2021-12-01 | End: 2022-02-22

## 2021-12-01 RX ORDER — TRAZODONE HYDROCHLORIDE 50 MG/1
50 TABLET ORAL NIGHTLY
COMMUNITY
End: 2021-12-01 | Stop reason: SDUPTHER

## 2021-12-09 ENCOUNTER — OFFICE VISIT (OUTPATIENT)
Dept: CARDIOLOGY | Facility: CLINIC | Age: 41
End: 2021-12-09
Payer: COMMERCIAL

## 2021-12-09 VITALS
SYSTOLIC BLOOD PRESSURE: 122 MMHG | DIASTOLIC BLOOD PRESSURE: 90 MMHG | BODY MASS INDEX: 36.98 KG/M2 | HEIGHT: 68 IN | HEART RATE: 80 BPM | WEIGHT: 244 LBS

## 2021-12-09 DIAGNOSIS — Z78.9 STATIN INTOLERANCE: ICD-10-CM

## 2021-12-09 DIAGNOSIS — Z95.1 S/P CABG (CORONARY ARTERY BYPASS GRAFT): ICD-10-CM

## 2021-12-09 PROCEDURE — 99214 PR OFFICE/OUTPT VISIT, EST, LEVL IV, 30-39 MIN: ICD-10-PCS | Mod: S$GLB,,,

## 2021-12-09 PROCEDURE — 99214 OFFICE O/P EST MOD 30 MIN: CPT | Mod: S$GLB,,,

## 2022-02-23 ENCOUNTER — PATIENT MESSAGE (OUTPATIENT)
Dept: CARDIOLOGY | Facility: CLINIC | Age: 42
End: 2022-02-23
Payer: COMMERCIAL

## 2022-03-24 ENCOUNTER — PATIENT MESSAGE (OUTPATIENT)
Dept: CARDIOLOGY | Facility: CLINIC | Age: 42
End: 2022-03-24
Payer: COMMERCIAL

## 2022-03-29 ENCOUNTER — LAB VISIT (OUTPATIENT)
Dept: LAB | Facility: HOSPITAL | Age: 42
End: 2022-03-29
Payer: COMMERCIAL

## 2022-03-29 ENCOUNTER — TELEPHONE (OUTPATIENT)
Dept: CARDIOLOGY | Facility: CLINIC | Age: 42
End: 2022-03-29
Payer: COMMERCIAL

## 2022-03-29 DIAGNOSIS — Z95.1 S/P CABG (CORONARY ARTERY BYPASS GRAFT): ICD-10-CM

## 2022-03-29 DIAGNOSIS — Z78.9 STATIN INTOLERANCE: ICD-10-CM

## 2022-03-29 LAB
CHOLEST SERPL-MCNC: 159 MG/DL (ref 120–199)
CHOLEST/HDLC SERPL: 4.3 {RATIO} (ref 2–5)
HDLC SERPL-MCNC: 37 MG/DL (ref 40–75)
HDLC SERPL: 23.3 % (ref 20–50)
LDLC SERPL CALC-MCNC: 91 MG/DL (ref 63–159)
NONHDLC SERPL-MCNC: 122 MG/DL
TRIGL SERPL-MCNC: 155 MG/DL (ref 30–150)
TSH SERPL DL<=0.005 MIU/L-ACNC: 1.84 UIU/ML (ref 0.34–5.6)

## 2022-03-29 PROCEDURE — 36415 COLL VENOUS BLD VENIPUNCTURE: CPT

## 2022-03-29 PROCEDURE — 84443 ASSAY THYROID STIM HORMONE: CPT

## 2022-03-29 PROCEDURE — 80061 LIPID PANEL: CPT

## 2022-03-29 NOTE — TELEPHONE ENCOUNTER
----- Message from Suri Rubio PA-C sent at 3/29/2022  2:08 PM CDT -----  Cholesterol much improved. Please keep appointment with Marilia next week.

## 2022-04-04 ENCOUNTER — OFFICE VISIT (OUTPATIENT)
Dept: CARDIOLOGY | Facility: CLINIC | Age: 42
End: 2022-04-04
Payer: COMMERCIAL

## 2022-04-04 VITALS
WEIGHT: 239 LBS | HEIGHT: 68 IN | BODY MASS INDEX: 36.22 KG/M2 | HEART RATE: 76 BPM | DIASTOLIC BLOOD PRESSURE: 108 MMHG | SYSTOLIC BLOOD PRESSURE: 150 MMHG

## 2022-04-04 DIAGNOSIS — Z78.9 STATIN INTOLERANCE: ICD-10-CM

## 2022-04-04 DIAGNOSIS — Z95.1 S/P CABG (CORONARY ARTERY BYPASS GRAFT): ICD-10-CM

## 2022-04-04 DIAGNOSIS — R07.89 ATYPICAL CHEST PAIN: Primary | ICD-10-CM

## 2022-04-04 DIAGNOSIS — I10 PRIMARY HYPERTENSION: ICD-10-CM

## 2022-04-04 PROCEDURE — 1160F RVW MEDS BY RX/DR IN RCRD: CPT | Mod: CPTII,S$GLB,, | Performed by: NURSE PRACTITIONER

## 2022-04-04 PROCEDURE — 3008F PR BODY MASS INDEX (BMI) DOCUMENTED: ICD-10-PCS | Mod: CPTII,S$GLB,, | Performed by: NURSE PRACTITIONER

## 2022-04-04 PROCEDURE — 1159F PR MEDICATION LIST DOCUMENTED IN MEDICAL RECORD: ICD-10-PCS | Mod: CPTII,S$GLB,, | Performed by: NURSE PRACTITIONER

## 2022-04-04 PROCEDURE — 93005 ELECTROCARDIOGRAM TRACING: CPT | Mod: S$GLB,,, | Performed by: NURSE PRACTITIONER

## 2022-04-04 PROCEDURE — 3077F SYST BP >= 140 MM HG: CPT | Mod: CPTII,S$GLB,, | Performed by: NURSE PRACTITIONER

## 2022-04-04 PROCEDURE — 3008F BODY MASS INDEX DOCD: CPT | Mod: CPTII,S$GLB,, | Performed by: NURSE PRACTITIONER

## 2022-04-04 PROCEDURE — 1159F MED LIST DOCD IN RCRD: CPT | Mod: CPTII,S$GLB,, | Performed by: NURSE PRACTITIONER

## 2022-04-04 PROCEDURE — 99214 PR OFFICE/OUTPT VISIT, EST, LEVL IV, 30-39 MIN: ICD-10-PCS | Mod: S$GLB,,, | Performed by: NURSE PRACTITIONER

## 2022-04-04 PROCEDURE — 93005 EKG 12-LEAD: ICD-10-PCS | Mod: S$GLB,,, | Performed by: NURSE PRACTITIONER

## 2022-04-04 PROCEDURE — 1160F PR REVIEW ALL MEDS BY PRESCRIBER/CLIN PHARMACIST DOCUMENTED: ICD-10-PCS | Mod: CPTII,S$GLB,, | Performed by: NURSE PRACTITIONER

## 2022-04-04 PROCEDURE — 3080F DIAST BP >= 90 MM HG: CPT | Mod: CPTII,S$GLB,, | Performed by: NURSE PRACTITIONER

## 2022-04-04 PROCEDURE — 99214 OFFICE O/P EST MOD 30 MIN: CPT | Mod: S$GLB,,, | Performed by: NURSE PRACTITIONER

## 2022-04-04 PROCEDURE — 3080F PR MOST RECENT DIASTOLIC BLOOD PRESSURE >= 90 MM HG: ICD-10-PCS | Mod: CPTII,S$GLB,, | Performed by: NURSE PRACTITIONER

## 2022-04-04 PROCEDURE — 3077F PR MOST RECENT SYSTOLIC BLOOD PRESSURE >= 140 MM HG: ICD-10-PCS | Mod: CPTII,S$GLB,, | Performed by: NURSE PRACTITIONER

## 2022-04-04 RX ORDER — AMLODIPINE BESYLATE 5 MG/1
5 TABLET ORAL DAILY
Qty: 30 TABLET | Refills: 11 | Status: SHIPPED | OUTPATIENT
Start: 2022-04-04 | End: 2024-01-25

## 2022-04-04 NOTE — PROGRESS NOTES
Subjective:    Patient ID:  Brenton Sheffield Jr is a 42 y.o. male patient here for evaluation Coronary Artery Disease      History of Present Illness:        is here today for his follow up visit. He denies chest pain or SOB. No arm neck or jaw pain. LDL is better. He has been walking and going to gym no issues. EKG abnormal however no changes. No angingal symptoms. Has been doing well denies any recent issues.       Review of patient's allergies indicates:   Allergen Reactions    Lipitor [atorvastatin]        No past medical history on file.  No past surgical history on file.  Social History     Tobacco Use    Smoking status: Never Smoker    Smokeless tobacco: Never Used   Substance Use Topics    Alcohol use: No        Review of Systems:    As noted in HPI in addition      REVIEW OF SYSTEMS  CARDIOVASCULAR: No recent chest pain, palpitations, arm, neck, or jaw pain  RESPIRATORY: No recent fever, cough chills, SOB or congestion  : No blood in the urine  GI: No Nausea, vomiting, constipation, diarrhea, blood, or reflux.  MUSCULOSKELETAL: No myalgias  NEURO: No lightheadedness or dizziness  EYES: No Double vision, blurry, vision or headache              Objective        Vitals:    04/04/22 1130   BP: (!) 150/108   Pulse: 76       LIPIDS - LAST 2   Lab Results   Component Value Date    CHOL 159 03/29/2022    CHOL 233 (H) 10/02/2021    HDL 37 (L) 03/29/2022    HDL 40 10/02/2021    LDLCALC 91.0 03/29/2022    LDLCALC 160 (H) 10/02/2021    TRIG 155 (H) 03/29/2022    TRIG 176 (H) 10/02/2021    CHOLHDL 23.3 03/29/2022    CHOLHDL 5.8 (H) 10/02/2021       CBC - LAST 2  Lab Results   Component Value Date    WBC 11.7 (H) 10/02/2021    RBC 6.18 (H) 10/02/2021    HGB 18.2 (H) 10/02/2021    HCT 55.3 (H) 10/02/2021    MCV 89.5 10/02/2021    MCH 29.4 10/02/2021    MCHC 32.9 10/02/2021    RDW 16.3 (H) 10/02/2021     10/02/2021    MPV 10.9 10/02/2021    LYMPH 2,012 10/02/2021    LYMPH 17.2 10/02/2021    MONO 1,112 (H)  10/02/2021    MONO 9.5 10/02/2021    BASO 82 10/02/2021       CHEMISTRY & LIVER FUNCTION - LAST 2  Lab Results   Component Value Date     10/02/2021    K 4.3 10/02/2021     10/02/2021    CO2 25 10/02/2021    BUN 10 10/02/2021    CREATININE 1.01 10/02/2021    GLU 93 10/02/2021    CALCIUM 10.0 10/02/2021    ALBUMIN 4.5 10/02/2021    PROT 7.7 10/02/2021    ALT 35 10/02/2021    AST 26 10/02/2021    BILITOT 1.0 10/02/2021        CARDIAC PROFILE - LAST 2  No results found for: BNP, CPK, CPKMB, LDH, TROPONINI     COAGULATION - LAST 2  No results found for: LABPT, INR, APTT    ENDOCRINE & PSA - LAST 2  Lab Results   Component Value Date    TSH 1.840 03/29/2022        ECHOCARDIOGRAM RESULTS  No results found for this or any previous visit.      CURRENT/PREVIOUS VISIT EKG  No results found for this or any previous visit.      PHYSICAL EXAM  CONSTITUTIONAL: Well built, well nourished in no apparent distress  NECK: no carotid bruit, no JVD  LUNGS: CTA  CHEST WALL: no tenderness  HEART: regular rate and rhythm, S1, S2 normal, no murmur, click, rub or gallop   ABDOMEN: soft, non-tender; bowel sounds normal; no masses,  no organomegaly  EXTREMITIES: Extremities normal, no edema, no calf tenderness noted  NEURO: AAO X 3    I HAVE REVIEWED :    The vital signs, nurses notes, and all the pertinent radiology and labs.        Current Outpatient Medications   Medication Instructions    amLODIPine (NORVASC) 5 mg, Oral, Daily    metoprolol succinate (TOPROL-XL) 50 MG 24 hr tablet TAKE 1 TABLET BY MOUTH EVERY DAY    pantoprazole (PROTONIX) 40 MG tablet TAKE 1 TABLET BY MOUTH EVERY DAY    PRALUENT PEN 75 mg/mL PnIj INJECT 75 MG UNDER THE SKIN EVERY 14 DAYS AS DIRECTED    testosterone cypionate (DEPOTESTOTERONE CYPIONATE) 200 mg/mL injection INJECT 2 MLS INTRAMUSCULARLY ONCE A MONTH    traZODone (DESYREL) 50 MG tablet TAKE 1 TABLET BY MOUTH EVERY EVENING.          Assessment & Plan     S/P CABG (coronary artery bypass  graft)  Stable No anginal symptoms  Continue same  LDL is down to 91 on praluent    Statin intolerance  Continue Praluent doing well.     Primary hypertension  Add Amlodipine 5 mg daily  Continue Metoprolol 25 mg daily      -Continue Diet and exercise        No follow-ups on file.

## 2022-05-03 ENCOUNTER — PATIENT MESSAGE (OUTPATIENT)
Dept: CARDIOLOGY | Facility: CLINIC | Age: 42
End: 2022-05-03
Payer: COMMERCIAL

## 2022-08-08 ENCOUNTER — TELEPHONE (OUTPATIENT)
Dept: CARDIOLOGY | Facility: CLINIC | Age: 42
End: 2022-08-08
Payer: COMMERCIAL

## 2022-08-08 NOTE — TELEPHONE ENCOUNTER
----- Message from Jag Camacho sent at 8/8/2022  3:24 PM CDT -----  Type:  Sooner Appointment Request    Caller is requesting a sooner appointment.  Caller declined first available appointment listed below.  Caller will not accept being placed on the waitlist and is requesting a message be sent to doctor.    Name of Caller:  Pt  When is the first available appointment?     Symptoms:  F/U in oct received letter  Best Call Back Number:  [phone]    Additional Information:  Please advise -- Thank you

## 2022-08-29 ENCOUNTER — TELEPHONE (OUTPATIENT)
Dept: CARDIOLOGY | Facility: CLINIC | Age: 42
End: 2022-08-29
Payer: COMMERCIAL

## 2023-05-17 ENCOUNTER — TELEPHONE (OUTPATIENT)
Dept: CARDIOLOGY | Facility: CLINIC | Age: 43
End: 2023-05-17
Payer: COMMERCIAL

## 2023-05-17 ENCOUNTER — PATIENT MESSAGE (OUTPATIENT)
Dept: CARDIOLOGY | Facility: CLINIC | Age: 43
End: 2023-05-17
Payer: COMMERCIAL

## 2023-05-17 RX ORDER — ALIROCUMAB 75 MG/ML
75 INJECTION, SOLUTION SUBCUTANEOUS
Qty: 6 EACH | Refills: 3 | Status: SHIPPED | OUTPATIENT
Start: 2023-05-17 | End: 2023-05-26 | Stop reason: SDUPTHER

## 2023-05-17 RX ORDER — ALIROCUMAB 75 MG/ML
INJECTION, SOLUTION SUBCUTANEOUS
Qty: 6 ML | Refills: 3 | Status: CANCELLED | OUTPATIENT
Start: 2023-05-17

## 2023-05-17 RX ORDER — ALIROCUMAB 75 MG/ML
75 INJECTION, SOLUTION SUBCUTANEOUS
COMMUNITY
End: 2023-05-17 | Stop reason: SDUPTHER

## 2023-05-17 RX ORDER — EVOLOCUMAB 140 MG/ML
75 INJECTION, SOLUTION SUBCUTANEOUS
Qty: 2 EACH | Refills: 3 | Status: SHIPPED | OUTPATIENT
Start: 2023-05-17 | End: 2023-05-17

## 2023-05-17 NOTE — TELEPHONE ENCOUNTER
S/W janay from [facility] and she stated that the repatha does not come in 75 mg she said it comes in 140 mg and wants to know if that is ok?

## 2023-05-17 NOTE — TELEPHONE ENCOUNTER
----- Message from Wil Perdomo sent at 5/17/2023 10:33 AM CDT -----  Type: Needs Medical Advice  Who Called:  Nancy from [facility]  Symptoms (please be specific):  said she need clear directions on pt's evolocumab (REPATHA SURECLICK) 140 mg/mL PnIj--please call and advise  H  Pharmacy name and phone #:      [id number] - [place] - [address] [address] [facility]  [place] [facility] [place] [address]  Phone: [phone] Fax: [phone]    Additional Information: thank you

## 2023-05-17 NOTE — TELEPHONE ENCOUNTER
----- Message from Zaid May sent at 5/17/2023 11:09 AM CDT -----  Contact: Nancy garcia [facility] at [phone]  Type:  Pharmacy Calling to Clarify an RX    Name of Caller:  Nancy  Pharmacy Name:  [facility]  Prescription Name:  Repatha inject 75 mg Praluent inject   What do they need to clarify?:  which one is the pt supposed to be on  Best Call Back Number:  [phone]  Additional Information[facility]  [facility]   Repatha inject 75 mg  or stay on Praluent inject   .

## 2023-05-22 NOTE — TELEPHONE ENCOUNTER
----- Message from Alexis Castro sent at 5/22/2023 11:39 AM CDT -----  Regarding: New Rx Needed  Contact: Santa at [phone]  Type:  Pharmacy Calling to Clarify an RX    Name of Caller:  Santa with [facility]    Pharmacy Name:    [id number] - [place], [place] - [id number] [facility] [address]  Phone: [phone] Fax: [phone]    Prescription Name:    alirocumab (PRALUENT PEN) 75 mg/mL PnIj 6 each 3 5/17/2023    Sig - Route: Inject 1 mL (75 mg total) into the skin every 14 (fourteen) days. - Subcutaneous   Sent to pharmacy as: alirocumab (PRALUENT PEN) 75 mg/mL PnIj   E-Prescribing Status: Receipt confirmed by pharmacy (5/17/2023  3:11 PM CDT)     What do they need to clarify?:  Need NEW Rx. Rx showing now closed in their system. They cannot fill.    Best Call Back Number:  [phone]    Additional Information:

## 2023-05-23 RX ORDER — ALIROCUMAB 75 MG/ML
75 INJECTION, SOLUTION SUBCUTANEOUS
Qty: 6 EACH | Refills: 3 | OUTPATIENT
Start: 2023-05-23

## 2023-05-26 RX ORDER — ALIROCUMAB 75 MG/ML
75 INJECTION, SOLUTION SUBCUTANEOUS
Qty: 6 EACH | Refills: 3 | Status: SHIPPED | OUTPATIENT
Start: 2023-05-26 | End: 2024-03-28

## 2023-12-07 ENCOUNTER — PATIENT MESSAGE (OUTPATIENT)
Dept: CARDIOLOGY | Facility: CLINIC | Age: 43
End: 2023-12-07

## 2023-12-07 RX ORDER — METOPROLOL SUCCINATE 50 MG/1
50 TABLET, EXTENDED RELEASE ORAL DAILY
Qty: 60 TABLET | Refills: 0 | Status: SHIPPED | OUTPATIENT
Start: 2023-12-07 | End: 2024-01-25 | Stop reason: SDUPTHER

## 2024-01-24 NOTE — PROGRESS NOTES
Subjective:    Patient ID:  Brenton Sheffield Jr is a 43 y.o. male who presents for follow-up of   Chief Complaint   Patient presents with    Hypertension    Coronary Artery Disease       HPI:      Brenton Sheffield Jr is here for follow-up visit. Denies chest pain or shortness of breath. Denies recent fever cough chills or congestion. Denies blood in the urine or blood in the stool.  Denies myalgias. Denies orthopnea or peripheral edema. Denies nausea vomiting or dyspepsia. No recent arm neck or jaw pain. No lightheadedness or dizziness.       Review of patient's allergies indicates:   Allergen Reactions    Lipitor [atorvastatin]        Past Medical History:   Diagnosis Date    Coronary artery disease     Hypertension      History reviewed. No pertinent surgical history.  Social History     Tobacco Use    Smoking status: Never    Smokeless tobacco: Never   Substance Use Topics    Alcohol use: No     History reviewed. No pertinent family history.     Review of Systems:   Constitution: Negative for diaphoresis and fever.   HEENT: Negative for nosebleeds.    Cardiovascular: Negative for chest pain       No dyspnea on exertion       No leg swelling        No palpitations  Respiratory: Negative for shortness of breath and wheezing.    Hematologic/Lymphatic: Negative for bleeding problem. Does not bruise/bleed easily.   Skin: Negative for color change and rash.   Musculoskeletal: Negative for falls and myalgias.   Gastrointestinal: Negative for hematemesis and hematochezia.   Genitourinary: Negative for hematuria.   Neurological: Negative for dizziness and light-headedness.   Psychiatric/Behavioral: Negative for altered mental status and memory loss.          Objective:        Vitals:    01/25/24 0902   BP: (!) 142/97   Pulse: 87       Lab Results   Component Value Date    WBC 11.7 (H) 10/02/2021    HGB 18.2 (H) 10/02/2021    HCT 55.3 (H) 10/02/2021     10/02/2021    CHOL 159 03/29/2022    TRIG 155 (H) 03/29/2022    HDL 37  (L) 03/29/2022    ALT 35 10/02/2021    AST 26 10/02/2021     10/02/2021    K 4.3 10/02/2021     10/02/2021    CREATININE 1.01 10/02/2021    BUN 10 10/02/2021    CO2 25 10/02/2021    TSH 1.840 03/29/2022        ECHOCARDIOGRAM RESULTS  No results found for this or any previous visit.        CURRENT/PREVIOUS VISIT EKG  Results for orders placed or performed in visit on 04/04/22   IN OFFICE EKG 12-LEAD (to [place])    Collection Time: 04/04/22 11:35 AM    Narrative    Test Reason : R07.89,    Vent. Rate : 076 BPM     Atrial Rate : 076 BPM     P-R Int : 182 ms          QRS Dur : 110 ms      QT Int : 380 ms       P-R-T Axes : 028 048 052 degrees     QTc Int : 427 ms    Normal sinus rhythm  Incomplete right bundle branch block  Nonspecific T wave abnormality  Abnormal ECG  No previous ECGs available  Confirmed by Kyra PETIT, Alfredo LOPEZ ([id number]) on 4/17/2022 5:27:01 PM    Referred By:             Confirmed By:Alfredo Rae MD           Physical Exam:  CONSTITUTIONAL: No fever, no chills  HEENT: Normocephalic, atraumatic,pupils reactive to light                 NECK:  No JVD no carotid bruit  CVS: S1S2+, RRR, no murmurs,   LUNGS: Clear  ABDOMEN: Soft, NT, BS+  EXTREMITIES: No cyanosis, edema  : No pillai catheter  NEURO: AAO X 3  PSY: Normal affect      Medication List with Changes/Refills   Current Medications    ALIROCUMAB (PRALUENT PEN) 75 MG/ML PNIJ    Inject 1 mL (75 mg total) into the skin every 14 (fourteen) days.    PANTOPRAZOLE (PROTONIX) 40 MG TABLET    TAKE 1 TABLET BY MOUTH EVERY DAY    TESTOSTERONE CYPIONATE (DEPOTESTOTERONE CYPIONATE) 200 MG/ML INJECTION    INJECT 2 MLS INTRAMUSCULARLY ONCE A MONTH   Changed and/or Refilled Medications    Modified Medication Previous Medication    METOPROLOL SUCCINATE (TOPROL-XL) 50 MG 24 HR TABLET metoprolol succinate (TOPROL-XL) 50 MG 24 hr tablet       Take 1 tablet (50 mg total) by mouth once daily.    Take 1 tablet (50 mg total) by mouth once daily.    Discontinued Medications    AMLODIPINE (NORVASC) 5 MG TABLET    Take 1 tablet (5 mg total) by mouth once daily.    TRAZODONE (DESYREL) 50 MG TABLET    TAKE 1 TABLET BY MOUTH EVERY EVENING.             Assessment:       1. Coronary artery disease involving native coronary artery of native heart without angina pectoris    2. Primary hypertension    3. S/P CABG (coronary artery bypass graft)    4. Statin intolerance         Plan:     Problem List Items Addressed This Visit          Cardiac/Vascular    Coronary artery disease involving native coronary artery of native heart without angina pectoris - Primary    Relevant Orders    IN OFFICE EKG 12-LEAD (to [place])    Comprehensive Metabolic Panel    CBC Auto Differential    Lipid Panel    Magnesium    S/P CABG (coronary artery bypass graft)    Relevant Orders    IN OFFICE EKG 12-LEAD (to [place])    Primary hypertension    Current Assessment & Plan     Keep BP Log  Have explained he may need to start BP medications  Low slat diet  Mediterranean Diet/Cardiovascular Exercise Program          Relevant Orders    Comprehensive Metabolic Panel    CBC Auto Differential    Lipid Panel    Magnesium       Other    Statin intolerance    Overview     Continue Repatha.             Follow up in about 1 year (around 1/25/2025).       Jena Conteh, RITA-ACNP, CVNP-BC

## 2024-01-25 ENCOUNTER — OFFICE VISIT (OUTPATIENT)
Dept: CARDIOLOGY | Facility: CLINIC | Age: 44
End: 2024-01-25
Payer: COMMERCIAL

## 2024-01-25 VITALS
HEART RATE: 87 BPM | BODY MASS INDEX: 35.23 KG/M2 | DIASTOLIC BLOOD PRESSURE: 97 MMHG | WEIGHT: 231.69 LBS | OXYGEN SATURATION: 98 % | SYSTOLIC BLOOD PRESSURE: 142 MMHG

## 2024-01-25 DIAGNOSIS — Z78.9 STATIN INTOLERANCE: ICD-10-CM

## 2024-01-25 DIAGNOSIS — I10 PRIMARY HYPERTENSION: ICD-10-CM

## 2024-01-25 DIAGNOSIS — I25.10 CORONARY ARTERY DISEASE INVOLVING NATIVE CORONARY ARTERY OF NATIVE HEART WITHOUT ANGINA PECTORIS: Primary | ICD-10-CM

## 2024-01-25 DIAGNOSIS — Z95.1 S/P CABG (CORONARY ARTERY BYPASS GRAFT): ICD-10-CM

## 2024-01-25 PROCEDURE — 3077F SYST BP >= 140 MM HG: CPT | Mod: CPTII,S$GLB,, | Performed by: NURSE PRACTITIONER

## 2024-01-25 PROCEDURE — 3008F BODY MASS INDEX DOCD: CPT | Mod: CPTII,S$GLB,, | Performed by: NURSE PRACTITIONER

## 2024-01-25 PROCEDURE — 3080F DIAST BP >= 90 MM HG: CPT | Mod: CPTII,S$GLB,, | Performed by: NURSE PRACTITIONER

## 2024-01-25 PROCEDURE — 99214 OFFICE O/P EST MOD 30 MIN: CPT | Mod: S$GLB,,, | Performed by: NURSE PRACTITIONER

## 2024-01-25 PROCEDURE — 1159F MED LIST DOCD IN RCRD: CPT | Mod: CPTII,S$GLB,, | Performed by: NURSE PRACTITIONER

## 2024-01-25 PROCEDURE — 99999 PR PBB SHADOW E&M-EST. PATIENT-LVL III: CPT | Mod: PBBFAC,,, | Performed by: NURSE PRACTITIONER

## 2024-01-25 RX ORDER — METOPROLOL SUCCINATE 50 MG/1
50 TABLET, EXTENDED RELEASE ORAL DAILY
Qty: 90 TABLET | Refills: 3 | Status: SHIPPED | OUTPATIENT
Start: 2024-01-25

## 2024-01-25 NOTE — ASSESSMENT & PLAN NOTE
Keep BP Log  Have explained he may need to start BP medications  Low slat diet  Mediterranean Diet/Cardiovascular Exercise Program

## 2024-02-22 RX ORDER — PANTOPRAZOLE SODIUM 40 MG/1
TABLET, DELAYED RELEASE ORAL
Qty: 90 TABLET | Refills: 3 | Status: SHIPPED | OUTPATIENT
Start: 2024-02-22

## 2024-03-25 DIAGNOSIS — E78.5 DYSLIPIDEMIA: Primary | ICD-10-CM

## 2024-03-25 NOTE — TELEPHONE ENCOUNTER
----- Message from Katie Burris, Patient Care Assistant sent at 3/25/2024 11:42 AM CDT -----  Contact: [facility]  Type: Needs Medical Advice    Who Called: [facility]/Ros Bishop Call Back Number: [phone]  Fax: [phone]  Inquiry/Question: Pharm is calling to get the pt's alirocumab (PRALUENT PEN) 75 mg/mL PnIj changed to Repatha due to insurance will no longer cover Praluent. Please advise Thank you~

## 2024-03-28 RX ORDER — EVOLOCUMAB 140 MG/ML
140 INJECTION, SOLUTION SUBCUTANEOUS
COMMUNITY
End: 2024-03-28 | Stop reason: SDUPTHER

## 2024-04-01 RX ORDER — EVOLOCUMAB 140 MG/ML
140 INJECTION, SOLUTION SUBCUTANEOUS
Qty: 6 EACH | Refills: 3 | Status: SHIPPED | OUTPATIENT
Start: 2024-04-01

## 2025-03-04 ENCOUNTER — PATIENT MESSAGE (OUTPATIENT)
Dept: CARDIOLOGY | Facility: CLINIC | Age: 45
End: 2025-03-04
Payer: COMMERCIAL

## 2025-03-06 RX ORDER — PANTOPRAZOLE SODIUM 40 MG/1
40 TABLET, DELAYED RELEASE ORAL DAILY
Qty: 90 TABLET | Refills: 0 | Status: SHIPPED | OUTPATIENT
Start: 2025-03-06

## 2025-04-29 ENCOUNTER — OFFICE VISIT (OUTPATIENT)
Dept: CARDIOLOGY | Facility: CLINIC | Age: 45
End: 2025-04-29
Payer: COMMERCIAL

## 2025-04-29 ENCOUNTER — PATIENT MESSAGE (OUTPATIENT)
Dept: CARDIOLOGY | Facility: CLINIC | Age: 45
End: 2025-04-29

## 2025-04-29 ENCOUNTER — LAB VISIT (OUTPATIENT)
Dept: LAB | Facility: HOSPITAL | Age: 45
End: 2025-04-29
Attending: NURSE PRACTITIONER
Payer: COMMERCIAL

## 2025-04-29 VITALS
HEART RATE: 76 BPM | BODY MASS INDEX: 34.75 KG/M2 | HEIGHT: 68 IN | SYSTOLIC BLOOD PRESSURE: 142 MMHG | WEIGHT: 229.25 LBS | DIASTOLIC BLOOD PRESSURE: 93 MMHG

## 2025-04-29 DIAGNOSIS — E78.5 DYSLIPIDEMIA: ICD-10-CM

## 2025-04-29 DIAGNOSIS — Z95.1 S/P CABG (CORONARY ARTERY BYPASS GRAFT): Primary | ICD-10-CM

## 2025-04-29 DIAGNOSIS — I10 PRIMARY HYPERTENSION: ICD-10-CM

## 2025-04-29 LAB
ALBUMIN SERPL-MCNC: 4.7 G/DL (ref 3.5–5.2)
ALP SERPL-CCNC: 107 UNIT/L (ref 55–135)
ALT SERPL-CCNC: 33 UNIT/L (ref 10–44)
ANION GAP (SMH): 10 MMOL/L (ref 8–16)
AST SERPL-CCNC: 24 UNIT/L (ref 10–40)
BILIRUB SERPL-MCNC: 0.7 MG/DL (ref 0.1–1)
BUN SERPL-MCNC: 14 MG/DL (ref 6–20)
CALCIUM SERPL-MCNC: 9.9 MG/DL (ref 8.7–10.5)
CHLORIDE SERPL-SCNC: 103 MMOL/L (ref 95–110)
CHOLEST SERPL-MCNC: 269 MG/DL (ref 120–199)
CHOLEST/HDLC SERPL: 7.7 {RATIO} (ref 2–5)
CO2 SERPL-SCNC: 23 MMOL/L (ref 23–29)
CREAT SERPL-MCNC: 1.2 MG/DL (ref 0.5–1.4)
EOSINOPHIL NFR BLD MANUAL: 11 % (ref 0–8)
ERYTHROCYTE [DISTWIDTH] IN BLOOD BY AUTOMATED COUNT: 16.6 % (ref 11.5–14.5)
GFR SERPLBLD CREATININE-BSD FMLA CKD-EPI: >60 ML/MIN/1.73/M2
GLUCOSE SERPL-MCNC: 104 MG/DL (ref 70–110)
HCT VFR BLD AUTO: 59.4 % (ref 40–54)
HDLC SERPL-MCNC: 35 MG/DL (ref 40–75)
HDLC SERPL: 13 % (ref 20–50)
HGB BLD-MCNC: 19.9 GM/DL (ref 14–18)
LDLC SERPL CALC-MCNC: 210 MG/DL (ref 63–159)
LYMPHOCYTES NFR BLD MANUAL: 23 % (ref 18–48)
MCH RBC QN AUTO: 30.1 PG (ref 27–31)
MCHC RBC AUTO-ENTMCNC: 33.5 G/DL (ref 32–36)
MCV RBC AUTO: 90 FL (ref 82–98)
MONOCYTES NFR BLD MANUAL: 8 % (ref 4–15)
NEUTROPHILS NFR BLD MANUAL: 58 % (ref 38–73)
NONHDLC SERPL-MCNC: 234 MG/DL
NUCLEATED RBC (/100WBC) (SMH): 0 /100 WBC
PLATELET # BLD AUTO: 271 K/UL (ref 150–450)
PMV BLD AUTO: 10.4 FL (ref 9.2–12.9)
POTASSIUM SERPL-SCNC: 4 MMOL/L (ref 3.5–5.1)
PROT SERPL-MCNC: 8 GM/DL (ref 6–8.4)
RBC # BLD AUTO: 6.62 M/UL (ref 4.6–6.2)
SODIUM SERPL-SCNC: 136 MMOL/L (ref 136–145)
TRIGL SERPL-MCNC: 120 MG/DL (ref 30–150)
WBC # BLD AUTO: 11.57 K/UL (ref 3.9–12.7)

## 2025-04-29 PROCEDURE — 99999 PR PBB SHADOW E&M-EST. PATIENT-LVL III: CPT | Mod: PBBFAC,,, | Performed by: NURSE PRACTITIONER

## 2025-04-29 PROCEDURE — 83718 ASSAY OF LIPOPROTEIN: CPT

## 2025-04-29 PROCEDURE — 3080F DIAST BP >= 90 MM HG: CPT | Mod: CPTII,S$GLB,, | Performed by: NURSE PRACTITIONER

## 2025-04-29 PROCEDURE — 82040 ASSAY OF SERUM ALBUMIN: CPT

## 2025-04-29 PROCEDURE — 36415 COLL VENOUS BLD VENIPUNCTURE: CPT

## 2025-04-29 PROCEDURE — 99214 OFFICE O/P EST MOD 30 MIN: CPT | Mod: S$GLB,,, | Performed by: NURSE PRACTITIONER

## 2025-04-29 PROCEDURE — 3077F SYST BP >= 140 MM HG: CPT | Mod: CPTII,S$GLB,, | Performed by: NURSE PRACTITIONER

## 2025-04-29 PROCEDURE — 3008F BODY MASS INDEX DOCD: CPT | Mod: CPTII,S$GLB,, | Performed by: NURSE PRACTITIONER

## 2025-04-29 PROCEDURE — 1159F MED LIST DOCD IN RCRD: CPT | Mod: CPTII,S$GLB,, | Performed by: NURSE PRACTITIONER

## 2025-04-29 PROCEDURE — 85025 COMPLETE CBC W/AUTO DIFF WBC: CPT

## 2025-04-29 RX ORDER — PANTOPRAZOLE SODIUM 40 MG/1
40 TABLET, DELAYED RELEASE ORAL DAILY
Qty: 90 TABLET | Refills: 3 | Status: SHIPPED | OUTPATIENT
Start: 2025-04-29

## 2025-04-29 RX ORDER — EVOLOCUMAB 140 MG/ML
140 INJECTION, SOLUTION SUBCUTANEOUS
Qty: 6 EACH | Refills: 3 | Status: SHIPPED | OUTPATIENT
Start: 2025-04-29

## 2025-04-29 RX ORDER — METOPROLOL SUCCINATE 50 MG/1
50 TABLET, EXTENDED RELEASE ORAL DAILY
Qty: 90 TABLET | Refills: 1 | Status: SHIPPED | OUTPATIENT
Start: 2025-04-29

## 2025-04-29 NOTE — PROGRESS NOTES
Subjective:    Patient ID:  Brenton Sheffield Jr is a 45 y.o. male who presents for follow-up of Hypertension and Coronary Artery Disease         History of Present Illness    Mr. Nettie heath presents today for follow up He denies chest pain, dyspnea, or lightheadedness. He takes metoprolol 25 mg daily (half of a 50 mg tablet).      ROS:  General: no fever, no chills, no fatigue, no weight gain  Eyes: no vision changes, no redness, no discharge  ENT: no ear pain, no nasal congestion, no sore throat  Cardiovascular: no chest pain, no palpitations, no lower extremity edema  Respiratory: no cough, no shortness of breath  Gastrointestinal: no abdominal pain, no nausea, no vomiting, no diarrhea, no constipation, no blood in stool  Genitourinary: no dysuria, no hematuria, no frequency  Musculoskeletal: no joint pain, no muscle pain  Skin: no rash, no lesion  Neurological: no headache, no dizziness, no numbness, no tingling  Psychiatric: no anxiety, no depression, no sleep difficulty                   Review of patient's allergies indicates:   Allergen Reactions    Lipitor [atorvastatin]      Past Medical History:   Diagnosis Date    Coronary artery disease     Hypertension      No past surgical history on file.  Social History[1]  No family history on file.                Objective:        Vitals:    04/29/25 0856   BP: (!) 142/93   Pulse: 76       Lab Results   Component Value Date    WBC 11.7 (H) 10/02/2021    HGB 18.2 (H) 10/02/2021    HCT 55.3 (H) 10/02/2021     10/02/2021    CHOL 159 03/29/2022    TRIG 155 (H) 03/29/2022    HDL 37 (L) 03/29/2022    ALT 35 10/02/2021    AST 26 10/02/2021     10/02/2021    K 4.3 10/02/2021     10/02/2021    CREATININE 1.01 10/02/2021    BUN 10 10/02/2021    CO2 25 10/02/2021    TSH 1.840 03/29/2022        ECHOCARDIOGRAM RESULTS  No results found for this or any previous visit.        CURRENT/PREVIOUS VISIT EKG  Results for orders placed or performed in visit on 04/04/22   IN  OFFICE EKG 12-LEAD (to [place])    Collection Time: 04/04/22 11:35 AM    Narrative    Test Reason : R07.89,    Vent. Rate : 076 BPM     Atrial Rate : 076 BPM     P-R Int : 182 ms          QRS Dur : 110 ms      QT Int : 380 ms       P-R-T Axes : 028 048 052 degrees     QTc Int : 427 ms    Normal sinus rhythm  Incomplete right bundle branch block  Nonspecific T wave abnormality  Abnormal ECG  No previous ECGs available  Confirmed by Kyra PETIT, Alfredo LOPEZ ([id number]) on 4/17/2022 5:27:01 PM    Referred By:             Confirmed By:Alfredo Rae MD       Physical Exam    General: No acute distress. Well-developed. Well-nourished.  Eyes: EOMI. Sclerae anicteric.  HENT: Normocephalic. Atraumatic. Nares patent. Moist oral mucosa.  Ears: Bilateral TMs clear. Bilateral EACs clear.  Cardiovascular: Regular rate. Regular rhythm. No murmurs. No rubs. No gallops. Normal S1, S2.  Respiratory: Normal respiratory effort. Clear to auscultation bilaterally. No rales. No rhonchi. No wheezing.  Abdomen: Soft. Non-tender. Non-distended. Normoactive bowel sounds.  Musculoskeletal: No  obvious deformity.  Extremities: No lower extremity edema.  Neurological: Alert & oriented x3. No slurred speech. Normal gait.  Psychiatric: Normal mood. Normal affect. Good insight. Good judgment.  Skin: Warm. Dry. No rash.        Medication List with Changes/Refills   Current Medications    TESTOSTERONE CYPIONATE (DEPOTESTOTERONE CYPIONATE) 200 MG/ML INJECTION    INJECT 2 MLS INTRAMUSCULARLY ONCE A MONTH   Changed and/or Refilled Medications    Modified Medication Previous Medication    EVOLOCUMAB (REPATHA SURECLICK) 140 MG/ML PNIJ evolocumab (REPATHA SURECLICK) 140 mg/mL PnIj       Inject 1 mL (140 mg total) into the skin every 14 (fourteen) days.    Inject 1 mL (140 mg total) into the skin every 14 (fourteen) days.    METOPROLOL SUCCINATE (TOPROL-XL) 50 MG 24 HR TABLET metoprolol succinate (TOPROL-XL) 50 MG 24 hr tablet       Take 1 tablet (50 mg  total) by mouth once daily. Pt taking 1/2 tab daily    Take 1 tablet (50 mg total) by mouth once daily.    PANTOPRAZOLE (PROTONIX) 40 MG TABLET pantoprazole (PROTONIX) 40 MG tablet       Take 1 tablet (40 mg total) by mouth once daily.    Take 1 tablet (40 mg total) by mouth once daily.         Assessment & Plan:       1. S/P CABG (coronary artery bypass graft)    2. Dyslipidemia    3. Primary hypertension      Assessment & Plan    E78.5 Dyslipidemia    PLAN SUMMARY:  1. Ordered fasting labs  2. Continue metoprolol dose from 50 mg (half tablet) to 25 mg  3. No further cardiac testing needed at this time  4. Follow up in 6 months or sooner if any cardiac symptoms develop    DYSLIPIDEMIA:  1. Ordered fasting labs.  2. Continue Repatha    LIFESTYLE CHANGES:  1. Regular exercise through work activities beneficial for cardiac health.  2. Note: The remaining information in the PLAN section does not directly relate to the given ICD-10 code (E78.5: Dyslipidemia) or lifestyle changes.  3. Here's that information for completeness: No further cardiac testing needed at this time, given absence of symptoms.  4.Continue  metoprolol from 50 mg (half tablet) to 25 mg.  5. Follow up in 6 months or sooner if any cardiac symptoms develop.             No follow-ups on file.    This note was generated with the assistance of ambient listening technology. Verbal consent was obtained by the patient and accompanying visitor(s) for the recording of patient appointment to facilitate this note. I attest to having reviewed and edited the generated note for accuracy, though some syntax or spelling errors may persist. Please contact the author of this note for any clarification.         Jena Conteh, AG-ACNP-BC,CVNP-BC            [1]   Social History  Tobacco Use    Smoking status: Never    Smokeless tobacco: Never   Substance Use Topics    Alcohol use: No